# Patient Record
Sex: MALE | Race: BLACK OR AFRICAN AMERICAN | NOT HISPANIC OR LATINO | URBAN - METROPOLITAN AREA
[De-identification: names, ages, dates, MRNs, and addresses within clinical notes are randomized per-mention and may not be internally consistent; named-entity substitution may affect disease eponyms.]

---

## 2018-02-15 ENCOUNTER — EMERGENCY (EMERGENCY)
Facility: HOSPITAL | Age: 28
LOS: 1 days | Discharge: HOME | End: 2018-02-15
Attending: EMERGENCY MEDICINE | Admitting: EMERGENCY MEDICINE
Payer: COMMERCIAL

## 2018-02-15 VITALS
OXYGEN SATURATION: 99 % | RESPIRATION RATE: 18 BRPM | SYSTOLIC BLOOD PRESSURE: 128 MMHG | TEMPERATURE: 98 F | HEART RATE: 60 BPM | WEIGHT: 264.33 LBS | DIASTOLIC BLOOD PRESSURE: 79 MMHG

## 2018-02-15 DIAGNOSIS — S63.630A SPRAIN OF INTERPHALANGEAL JOINT OF RIGHT INDEX FINGER, INITIAL ENCOUNTER: ICD-10-CM
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
## 2018-02-15 DIAGNOSIS — M79.644 PAIN IN RIGHT FINGER(S): ICD-10-CM
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
## 2018-02-15 PROCEDURE — 73140 X-RAY EXAM OF FINGER(S): CPT

## 2018-02-15 PROCEDURE — 99283 EMERGENCY DEPT VISIT LOW MDM: CPT | Mod: 25

## 2018-02-15 PROCEDURE — 99283 EMERGENCY DEPT VISIT LOW MDM: CPT

## 2018-02-15 PROCEDURE — 73140 X-RAY EXAM OF FINGER(S): CPT | Mod: 26,RT

## 2018-02-15 NOTE — ED PROVIDER NOTE - MEDICAL DECISION MAKING DETAILS
pain after relocation, suspect partial tendon injury, pt w still rom w assoc swelling. xr obtained to eval osseous structures, placed in splint, to fu w ortho.

## 2018-02-15 NOTE — ED PROVIDER NOTE - OBJECTIVE STATEMENT
Pt previously healthy with complaints of R index finger injury, states on Sat ran into other player, got dislocated and then relocated, assoc swelling Pt previously healthy with complaints of R index finger injury, states on Sat ran into other player, got dislocated and then relocated, assoc swelling, has pain to Pt previously healthy with complaints of R index finger injury, states on Sat ran into other player, got dislocated and then relocated, assoc swelling, has pain to PIP no assoc paresthesias, numbness or other injury. Aching, dull in nature. Pt has not tried anything for symptoms, no other aggravating or relieving factors.